# Patient Record
Sex: FEMALE | URBAN - METROPOLITAN AREA
[De-identification: names, ages, dates, MRNs, and addresses within clinical notes are randomized per-mention and may not be internally consistent; named-entity substitution may affect disease eponyms.]

---

## 2019-11-19 ENCOUNTER — IMPORTED ENCOUNTER (OUTPATIENT)
Dept: URBAN - METROPOLITAN AREA CLINIC 31 | Facility: CLINIC | Age: 74
End: 2019-11-19

## 2019-11-19 PROBLEM — H10.403: Noted: 2019-11-19

## 2019-11-19 PROBLEM — Z96.1: Noted: 2019-11-19

## 2019-11-19 PROBLEM — H04.123: Noted: 2019-11-19

## 2019-11-19 PROCEDURE — 99203 OFFICE O/P NEW LOW 30 MIN: CPT

## 2019-11-19 NOTE — PATIENT DISCUSSION
1.  Pseudophakia OU - IOLs stable. PCO OD--needs to be dilated to determine if needs yag OD. 2.  Dry Eyes OU:  Start Retaine MGD qid ou. Encouraged regular use. 3.  Allergic Conjunctivitis OU -- The condition was  discussed with the patient. Avoidance of allergens and cool compresses were recommended. wash lids and keep clean. 4. RTN 1 week DF--use light dilation drops. May need yag OD for vision problems.

## 2019-11-22 ENCOUNTER — IMPORTED ENCOUNTER (OUTPATIENT)
Dept: URBAN - METROPOLITAN AREA CLINIC 31 | Facility: CLINIC | Age: 74
End: 2019-11-22

## 2019-11-22 PROBLEM — H10.403: Noted: 2019-11-22

## 2019-11-22 PROBLEM — H17.89: Noted: 2019-11-22

## 2019-11-22 PROBLEM — H26.491: Noted: 2019-11-22

## 2019-11-22 PROBLEM — Z96.1: Noted: 2019-11-22

## 2019-11-22 PROBLEM — H04.123: Noted: 2019-11-22

## 2019-11-22 PROCEDURE — 92014 COMPRE OPH EXAM EST PT 1/>: CPT

## 2019-11-22 PROCEDURE — 92015 DETERMINE REFRACTIVE STATE: CPT

## 2019-11-22 NOTE — PATIENT DISCUSSION
1  S/P  Lasik OU--    epith cells ingrowth OS-- eval with DR Kulkarni --possibly causing astig OS2. Pseudophakia OU - IOLs stable. PCO OD-   eval for yag OD/ clear capsule OS. 3.  Dry Eyes OU:  Cont Retaine MGD qid ou. Encouraged regular use. 4.  Allergic Conjunctivitis OU -- The condition was  discussed with the patient. Avoidance of allergens and cool compresses were recommended. wash lids and keep clean. 5. Expl astig OU OS>OD--May need correction for it.   6.  Yag consult OD and OS corneal eval for ingrowth OS--DR Kulkarni

## 2019-12-19 ENCOUNTER — IMPORTED ENCOUNTER (OUTPATIENT)
Dept: URBAN - METROPOLITAN AREA CLINIC 31 | Facility: CLINIC | Age: 74
End: 2019-12-19

## 2019-12-19 PROBLEM — H17.89: Noted: 2019-12-19

## 2019-12-19 PROBLEM — Z96.1: Noted: 2019-12-19

## 2019-12-19 PROBLEM — H26.491: Noted: 2019-12-19

## 2019-12-19 PROBLEM — H17.822: Noted: 2019-12-19

## 2019-12-19 PROCEDURE — 99214 OFFICE O/P EST MOD 30 MIN: CPT

## 2019-12-19 PROCEDURE — 92025 CPTRIZED CORNEAL TOPOGRAPHY: CPT

## 2019-12-19 NOTE — PATIENT DISCUSSION
1.  PCO  OD (Posterior Capsule Opacification)   PCO is visually significant and impairment of vision does not meet the patient’s functional needs or interferes with activities of daily living. Risks benefits and alternatives to the Nd:YAG Laser reviewed including elevated IOP immediately postop and retinal tear/detachment. Patient to notify their ophthalmologist promptly if they have a significant change in symptoms such as flashes of light (photopsia) an increase in floaters loss of visual field or decrease in visual acuity after the procedure. Patient will be scheduled in [place] [id number] for Nd:YAG Laser. 2. S/P  Lasik: monovision Od distance OS reading. Ingrowth OS with secondary visual distortion and irregular astigmatism. Risks and benefits discussed recommend Yag laser PTK OS to stimulate resorption of cells. Schedule  after yag caps OD. If does not resolve may need flap lift with removal of cells and glue. 3. Pseudophakia OU - IOLs stable. Monitorfor changes in vision. 4.   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

## 2019-12-19 NOTE — PATIENT DISCUSSION
S/P  Lasik: monovision Od distance OS reading. Ingrowth OS with secondary visual distortion and irregular astigmatism. Risks and benefits discussed recommend Yag laser PTK OS to stimulate resorption of cells. Schedule  after yag caps OD. If does not resolve may need flap lift with removal of cells and glue.

## 2020-01-30 ENCOUNTER — IMPORTED ENCOUNTER (OUTPATIENT)
Dept: URBAN - METROPOLITAN AREA CLINIC 31 | Facility: CLINIC | Age: 75
End: 2020-01-30

## 2020-01-30 PROBLEM — Z98.89: Noted: 2020-01-30

## 2020-01-30 PROCEDURE — 99024 POSTOP FOLLOW-UP VISIT: CPT

## 2020-01-30 NOTE — PATIENT DISCUSSION
Post Operative: Doing well OS s/p yag laser PTK ingrowth is decreasing. tears prn. Call with any problems. Hopefully astigmatism decreases as cell resolve.

## 2020-01-30 NOTE — PATIENT DISCUSSION
1.  s/p YAG laser capsulotomy for Posterior Capsule Opacification (PCO) in the Right Eye (OD). Doing better. Please contact us if you have a significant change in symptoms such as flashes of light (photopsia) increased floaters decrease/loss of visual field or visual acuity. 2. Post Operative: Doing well OS s/p yag laser PTK ingrowth is decreasing. tears prn. Call with any problems. Hopefully astigmatism decreases as cell resolve. 3. Return for an appointment in 2 months for post op exam. MRx. OS with Dr. Priyanka Louis.

## 2020-02-25 ENCOUNTER — IMPORTED ENCOUNTER (OUTPATIENT)
Dept: URBAN - METROPOLITAN AREA CLINIC 31 | Facility: CLINIC | Age: 75
End: 2020-02-25

## 2020-02-25 PROBLEM — Z98.89: Noted: 2020-02-25

## 2020-02-25 PROBLEM — H17.89: Noted: 2020-02-25

## 2020-02-25 PROBLEM — H10.403: Noted: 2020-02-25

## 2020-02-25 PROBLEM — H26.491: Noted: 2020-02-25

## 2020-02-25 PROBLEM — H04.123: Noted: 2020-02-25

## 2020-02-25 PROBLEM — Z96.1: Noted: 2020-02-25

## 2020-02-25 PROCEDURE — 99024 POSTOP FOLLOW-UP VISIT: CPT

## 2020-02-25 NOTE — PATIENT DISCUSSION
1.  Dry Eyes OU:    Rx LOtemax tid ou Retaine jermain qhs ou retaine MGD qid. Encouraged regular use. 2.  S/P  Lasik OU--    MONO.   epith cells ingrowth OS-- eval with DR Kulkarni --possibly causing astig OS3. Pseudophakia OU - IOLs stable. Open capsule OD Clear capsule OS. 4.  Allergic Conjunctivitis OU -- The condition was  discussed with the patient. Avoidance of allergens and cool compresses were recommended. wash lids and keep clean. 5. S/P yag laser PTK OS 1/20/20--- ingrowth is decreasing. tears prn. Call with any problems. Hopefully astigmatism decreases as cell resolve. 6. RTN 2 weeks OC--FU on dryness and discomfort7. RTN 3/20 post op exam. MRx. OS with Dr. Milly Ortiz.

## 2020-02-25 NOTE — PATIENT DISCUSSION
1.  s/p YAG laser capsulotomy for Posterior Capsule Opacification (PCO) in the Right Eye (OD). Doing better. Please contact us if you have a significant change in symptoms such as flashes of light (photopsia) increased floaters decrease/loss of visual field or visual acuity. 2. Post Operative: Doing well OS s/p yag laser PTK ingrowth is decreasing. tears prn. Call with any problems. Hopefully astigmatism decreases as cell resolve. 3. Return for an appointment in 2 months for post op exam. MRx. OS with Dr. Rozina Luz.

## 2020-03-11 ENCOUNTER — IMPORTED ENCOUNTER (OUTPATIENT)
Dept: URBAN - METROPOLITAN AREA CLINIC 31 | Facility: CLINIC | Age: 75
End: 2020-03-11

## 2020-03-11 PROBLEM — Z98.89: Noted: 2020-03-11

## 2020-03-18 ENCOUNTER — IMPORTED ENCOUNTER (OUTPATIENT)
Dept: URBAN - METROPOLITAN AREA CLINIC 31 | Facility: CLINIC | Age: 75
End: 2020-03-18

## 2020-03-18 PROBLEM — H10.403: Noted: 2020-03-18

## 2020-03-18 PROBLEM — H17.89: Noted: 2020-03-18

## 2020-03-18 PROBLEM — Z98.89: Noted: 2020-03-18

## 2020-03-18 PROBLEM — H04.123: Noted: 2020-03-18

## 2020-03-18 PROBLEM — Z96.1: Noted: 2020-03-18

## 2020-03-18 PROBLEM — H26.491: Noted: 2020-03-18

## 2020-03-18 NOTE — PATIENT DISCUSSION
1.  Dry Eyes OU:    Rx LOtemax tid ou Retaine jermain qhs ou retaine MGD qid. Encouraged regular use. 2.  S/P  Lasik OU--    MONO.   epith cells ingrowth OS-- eval with DR Kulkarni --possibly causing astig OS3. Pseudophakia OU - IOLs stable. Open capsule OD Clear capsule OS. 4.  Allergic Conjunctivitis OU -- The condition was  discussed with the patient. Avoidance of allergens and cool compresses were recommended. wash lids and keep clean. 5. S/P yag laser PTK OS 1/20/20--- ingrowth is decreasing. tears prn. Call with any problems. Hopefully astigmatism decreases as cell resolve. 6. RTN 2 weeks OC--FU on dryness and discomfort7. RTN 3/20 post op exam. MRx. OS with Dr. Deejay Flores.

## 2022-04-02 ASSESSMENT — VISUAL ACUITY
OD_CC: 20/30
OD_CC: 20/20
OS_CC: 20/50-1
OD_SC: 20/30-1
OU_SC: 20/30-1
OS_GLARE: 20/40-1MED
OS_SC: J2
OD_CC: 20/25-2
OS_SC: J2
OS_SC: 20/100
OD_CC: 20/50+2
OS_SC: 20/2016''
OS_SC: 20/20-1
OU_CC: 20/50-1
OD_CC: 20/40+2
OS_CC: 20/50-1
OD_GLARE: 20/40-1MED

## 2022-04-02 ASSESSMENT — TONOMETRY
OS_IOP_MMHG: 18
OD_IOP_MMHG: 20
OD_IOP_MMHG: 16
OD_IOP_MMHG: 18

## 2022-06-04 ENCOUNTER — TELEPHONE ENCOUNTER (OUTPATIENT)
Dept: URBAN - METROPOLITAN AREA CLINIC 68 | Facility: CLINIC | Age: 77
End: 2022-06-04

## 2022-06-05 ENCOUNTER — TELEPHONE ENCOUNTER (OUTPATIENT)
Dept: URBAN - METROPOLITAN AREA CLINIC 68 | Facility: CLINIC | Age: 77
End: 2022-06-05

## 2022-06-05 RX ORDER — MULTIVITAMIN
MULTIPLE VITAMIN(  ORAL  DAILY ) ACTIVE -HX ENTRY TABLET ORAL DAILY
Status: ACTIVE | COMMUNITY
Start: 2013-05-22

## 2022-06-05 RX ORDER — ROSUVASTATIN CALCIUM 10 MG
CRESTOR( 10MG ORAL  DAILY ) ACTIVE -HX ENTRY TABLET ORAL DAILY
Status: ACTIVE | COMMUNITY
Start: 2013-05-22

## 2022-06-05 RX ORDER — POLYETHYLENE GLYCOL 3350, SODIUM SULFATE, SODIUM CHLORIDE, POTASSIUM CHLORIDE, ASCORBIC ACID, SODIUM ASCORBATE 7.5-2.691G
KIT ORAL
Qty: 1 | Refills: 0 | Status: ACTIVE | COMMUNITY
Start: 2013-05-22

## 2022-06-25 ENCOUNTER — TELEPHONE ENCOUNTER (OUTPATIENT)
Age: 77
End: 2022-06-25

## 2022-06-26 ENCOUNTER — TELEPHONE ENCOUNTER (OUTPATIENT)
Age: 77
End: 2022-06-26

## 2022-06-26 RX ORDER — ASPIRIN 81 MG/1
TABLET, COATED ORAL
Status: ACTIVE | COMMUNITY
Start: 2008-04-16

## 2022-06-26 RX ORDER — ROSUVASTATIN CALCIUM 10 MG
CRESTOR( 10MG ORAL  DAILY ) ACTIVE -HX ENTRY TABLET ORAL DAILY
Status: ACTIVE | COMMUNITY
Start: 2013-05-22

## 2022-06-26 RX ORDER — SIMETHICONE 125 MG/1
GAS-X(    AS NEEDED ) ACTIVE -HX ENTRY CAPSULE, LIQUID FILLED ORAL AS NEEDED
Status: ACTIVE | COMMUNITY
Start: 2008-05-16

## 2022-06-26 RX ORDER — POLYETHYLENE GLYCOL 3350, SODIUM SULFATE, SODIUM CHLORIDE, POTASSIUM CHLORIDE, ASCORBIC ACID, SODIUM ASCORBATE 7.5-2.691G
KIT ORAL
Qty: 1 | Refills: 0 | Status: ACTIVE | COMMUNITY
Start: 2013-05-22